# Patient Record
Sex: FEMALE | Race: WHITE
[De-identification: names, ages, dates, MRNs, and addresses within clinical notes are randomized per-mention and may not be internally consistent; named-entity substitution may affect disease eponyms.]

---

## 2021-09-23 ENCOUNTER — HOSPITAL ENCOUNTER (OUTPATIENT)
Dept: HOSPITAL 75 - CARD | Age: 61
End: 2021-09-23
Attending: INTERNAL MEDICINE
Payer: COMMERCIAL

## 2021-09-23 VITALS — SYSTOLIC BLOOD PRESSURE: 136 MMHG | DIASTOLIC BLOOD PRESSURE: 60 MMHG

## 2021-09-23 DIAGNOSIS — I51.7: Primary | ICD-10-CM

## 2021-09-23 DIAGNOSIS — R94.31: ICD-10-CM

## 2021-09-23 PROCEDURE — 78452 HT MUSCLE IMAGE SPECT MULT: CPT

## 2021-09-23 PROCEDURE — 93306 TTE W/DOPPLER COMPLETE: CPT

## 2021-09-23 PROCEDURE — 93017 CV STRESS TEST TRACING ONLY: CPT

## 2021-09-23 NOTE — NUCLEAR STRESS TEST
TREADMILL NUCLEAR STRESS TEST


Date of procedure: 09/23/2021.


Primary care provider: Unknown.


Admitting physician: Rosales Daniels Jr., MD.





INDICATION: Abnormal ECG.





BASELINE ELECTROCARDIOGRAM: 


Sinus bradycardia 57 bpm with possible old septal myocardial infarction.





STRESS TEST PROCEDURE:  The patient was exercised for a total of 7 minutes and 

30 seconds of the standard Baltazar protocol achieving a maximum MET level of 8.8. 

The resting heart rate was 57 bpm and the peak heart rate was 140 bpm, which 

represents 87% of the maximum predicted heart rate.  The resting blood pressure 

was 145/58 mmHg and the peak blood pressure was 192/87 mmHg.  This represents a 

normal heart rate and a normal blood pressure response to exercise.  The test 

was stopped due to fatigue.  There was no chest discomfort during the test.  

There were isolated premature ventricular complexes during the test.  There were

no significant stress induced electrocardiogram changes. The patient exhibited 

good exercise capacity for age.





NUCLEAR PROCEDURE: The patient was administered 10.5 mCi of intravenous 

technetium 99m Tetrofosmin at rest for the rest images.  The patient was 

subsequently administered 31.2 mCi of intravenous technetium 99 M Tetrofosmin at

peak stress for the stress images.  Following an appropriate wait after each 

injection, imaging was obtained.  The images were subsequently processed and 

reformatted in the usual views.  Gated imaging was obtained.  The image quality 

was adequate but with some degree of gastrointestinal attenuation artifact. CT 

attenuation correction was used as a adjunct to standard imaging. Both the 

corrected and uncorrected images were reviewed for interpretation. 





NUCLEAR RESULTS: There was normal myocardial perfusion in all segments without 

evidence of infarction or ischemia.  There was normal left ventricular chamber 

size with an end-diastolic volume of 86 mL and an end-systolic volume of 27 mL. 

There was no evidence of transient ischemic dilatation.  The TID ratio was 1.11.

 There was normal wall motion in all segments with a calculated ejection 

fraction of 68%.





IMPRESSION:


1.  Normal heart rate and blood pressure response to exercise.


2.  There was no exercise-induced chest discomfort.


3.  There were isolated premature ventricular complexes during the test.


4.  There were no exercise-induced electrocardiogram changes.


5.  The patient exhibited good exercise capacity for age at 7 minutes and 30 

seconds of the Baltazar protocol.


6.  There was normal myocardial perfusion in all segments without evidence of 

infarction or ischemia.


7.  There was normal wall motion in all segments with a calculated ejection 

fraction of 60%.





Certain portions of this document may have been dictated utilizing voice 

recognition technology.  Inherent to this technology, typographical and 

grammatical errors may exist.  As much as I am diligent to identify and correct 

these mistakes, some errors may remain in the document.











ROSALES DANIELS JR, MD         Sep 23, 2021 18:42